# Patient Record
Sex: MALE | Race: WHITE | NOT HISPANIC OR LATINO | Employment: FULL TIME | URBAN - METROPOLITAN AREA
[De-identification: names, ages, dates, MRNs, and addresses within clinical notes are randomized per-mention and may not be internally consistent; named-entity substitution may affect disease eponyms.]

---

## 2023-05-23 DIAGNOSIS — Z00.6 EXAMINATION FOR NORMAL COMPARISON OR CONTROL IN CLINICAL RESEARCH: Primary | ICD-10-CM

## 2023-05-30 ENCOUNTER — PATIENT OUTREACH (OUTPATIENT)
Dept: ONCOLOGY | Facility: CLINIC | Age: 53
End: 2023-05-30

## 2023-05-30 ENCOUNTER — OFFICE VISIT (OUTPATIENT)
Dept: PULMONOLOGY | Facility: CLINIC | Age: 53
End: 2023-05-30

## 2023-05-30 VITALS
BODY MASS INDEX: 35.89 KG/M2 | SYSTOLIC BLOOD PRESSURE: 116 MMHG | OXYGEN SATURATION: 97 % | HEIGHT: 72 IN | TEMPERATURE: 98.4 F | WEIGHT: 265 LBS | DIASTOLIC BLOOD PRESSURE: 80 MMHG | HEART RATE: 117 BPM

## 2023-05-30 DIAGNOSIS — R91.1 PULMONARY NODULE: Primary | ICD-10-CM

## 2023-05-30 DIAGNOSIS — R91.1 LUNG NODULE: Primary | ICD-10-CM

## 2023-05-30 DIAGNOSIS — I26.99 OTHER ACUTE PULMONARY EMBOLISM, UNSPECIFIED WHETHER ACUTE COR PULMONALE PRESENT: ICD-10-CM

## 2023-05-30 DIAGNOSIS — I26.99 PULMONARY INFARCT: ICD-10-CM

## 2023-05-30 DIAGNOSIS — R07.89 NON-CARDIAC CHEST PAIN: ICD-10-CM

## 2023-05-30 RX ORDER — PREDNISONE 10 MG/1
TABLET ORAL
Qty: 31 TABLET | Refills: 0 | Status: SHIPPED | OUTPATIENT
Start: 2023-05-30

## 2023-05-30 RX ORDER — LEVOFLOXACIN 750 MG/1
1 TABLET ORAL DAILY
COMMUNITY
Start: 2023-05-15 | End: 2023-05-30

## 2023-05-30 RX ORDER — LISINOPRIL 5 MG/1
1 TABLET ORAL DAILY
COMMUNITY
Start: 2023-02-15

## 2023-05-30 NOTE — PROGRESS NOTES
New Patient Pulmonary Office Visit      Patient Name: Adriano Galindo    Referring Physician: King Lyons PA    Chief Complaint:    Chief Complaint   Patient presents with   • Lung Nodule     F/u        History of Present Illness: Adriano Galindo is a 52 y.o. male who is here today to establish care with Pulmonary.  Patient is a past medical history significant for pulmonary embolism, hypertension and hyperlipidemia.  Who had had a CT scan of the chest back in April 2023 where there was a fairly large burden of pulmonary embolism in the right main pulmonary artery extending out through the right upper and lower lobes.  At that point in time there was a new nodular density subpleural in the right middle lobe and right lower lobe crossing the fissure.  Patient continued to have ongoing chest pain therefore had a repeat CT scan in May 2023 showing that the majority of the pulmonary embolism had resolved, but the nodular 19 mm density that was subpleural was still present.  He states that his chest pain has gradually gotten better.  He denies any nausea, vomiting, fever, or chills.  Prior to all the symptoms beginning back in October he was having pain in his hips and found that he could not walk around as much and also getting very short of breath.  He did a full set of PFTs through his primary care provider in addition to that had a large work-up performed including autoimmune diseases but states everything was negative.  It is that since he was found to have a pulmonary embolism he continues to have symptoms.    Review of Systems:   Review of Systems   Constitutional: Positive for fatigue. Negative for activity change, appetite change, chills and diaphoresis.   HENT: Negative for congestion, postnasal drip, sinus pressure and voice change.    Eyes: Negative for blurred vision.   Respiratory: Positive for shortness of breath. Negative for cough and wheezing.    Cardiovascular: Negative for chest pain.  "  Gastrointestinal: Negative for abdominal pain.   Musculoskeletal: Positive for arthralgias. Negative for myalgias.   Skin: Negative for color change and dry skin.   Allergic/Immunologic: Negative for environmental allergies.   Neurological: Negative for weakness and confusion.   Hematological: Negative for adenopathy.   Psychiatric/Behavioral: Negative for sleep disturbance and depressed mood.       Past Medical History: History reviewed. No pertinent past medical history.    Past Surgical History: History reviewed. No pertinent surgical history.    Family History:   Family History   Problem Relation Age of Onset   • Pulmonary fibrosis Father        Social History:   Social History     Socioeconomic History   • Marital status:    Tobacco Use   • Smoking status: Former     Types: Cigarettes     Quit date: 2023     Years since quittin.1   Substance and Sexual Activity   • Drug use: Not Currently   • Sexual activity: Defer       Medications:     Current Outpatient Medications:   •  lisinopril (PRINIVIL,ZESTRIL) 5 MG tablet, Take 1 tablet by mouth Daily., Disp: , Rfl:   •  apixaban (ELIQUIS) 5 MG tablet tablet, Take 1 tablet by mouth 2 (Two) Times a Day., Disp: 60 tablet, Rfl: 6  •  predniSONE (DELTASONE) 10 MG tablet, Take 4 tabs daily x 4 days, then take 3 daily x 3 days, then take 2 daily for 2 days, then take 1 daily x 2 days, Disp: 31 tablet, Rfl: 0    Allergies:   No Known Allergies    Physical Exam:  Vital Signs:   Vitals:    23 1057   BP: 116/80   BP Location: Left arm   Patient Position: Sitting   Cuff Size: Adult   Pulse: 117   Temp: 98.4 °F (36.9 °C)   TempSrc: Infrared   SpO2: 97%  Comment: resting room air   Weight: 120 kg (265 lb)   Height: 182.9 cm (72\")       Physical Exam  Vitals and nursing note reviewed.   Constitutional:       General: He is not in acute distress.     Appearance: He is well-developed and normal weight. He is not ill-appearing or toxic-appearing. "   Cardiovascular:      Rate and Rhythm: Normal rate and regular rhythm.      Pulses: Normal pulses.      Heart sounds: Normal heart sounds. No murmur heard.    No gallop.   Pulmonary:      Effort: Pulmonary effort is normal.      Breath sounds: Normal breath sounds. No wheezing, rhonchi or rales.   Musculoskeletal:      Right lower leg: No edema.      Left lower leg: No edema.   Neurological:      Mental Status: He is alert.         Immunization History   Administered Date(s) Administered   • COVID-19 (PFIZER) Purple Cap Monovalent 04/14/2021, 05/04/2021   • FluLaval/Fluzone >6mos 11/30/2018   • Hepatitis A 11/30/2018       Results Review:   -I personally viewed the patient's CT scan of the chest from May 2023 which continue to show an 18 mm subpleural nodule in the right upper and lower lobes that is unchanged to slightly smaller from April 2023 in addition to some subsegmental clots throughout the right lower lobe.  -I personally viewed the patient's CT scan of the chest from April 2023, which showed a 18 mm subpleural nodule between the right upper and lower lobes with a large amount of clot burden in the main pulmonary artery  - I personally reviewed the pts imaging from CT scan of the chest from January 2023 showed no nodules, masses, or infiltrates.  - I personally reviewed the pts chart with regards to evaluation by the patient's PCP.    Assessment / Plan:   Diagnoses and all orders for this visit:    1. Pulmonary nodule (Primary)  2. Pulmonary infarct  3. Other acute pulmonary embolism, unspecified whether acute cor pulmonale present  4. Non-cardiac chest pain  -Patient appears to have a pulmonary infarct in the setting of his previously seen pulmonary nodule.  The nodule is roughly stable in size to a little bit smaller over just the course of 1 month and even back to January there was really no large nodular density in that area.  Think cancer is very low likelihood.  Most likely this is an infarct with an  area of atelectasis.  There could be some post infection, but he has completed a couple rounds of antibiotics at this point states symptoms have improved especially when is on antibiotics.  He has not received any steroids.  There is also the possibility of ongoing inflammation in the area.  His most recent CT scan showed that the clot had resolved drastically at this point.  For now want him to go ahead and start a prednisone taper for the possibility of organizing pneumonia in the area.  He also should continue on his Eliquis for at least 6 months.  I went ahead and refilled his Eliquis on today's visit.  And then I will also plan for repeat CT scan in 3 months.  -I am going to get the records from his PCP regarding his autoimmune labs and his PFTs.  He informed me that all this was normal.  If that is not true or testing is an adequate.  We will look to repeat testing at the next visit.  -I will plan to do a coagulopathy work-up if not been done already after he has completed his course of anticoagulation.      Follow Up:   Return in about 3 months (around 8/30/2023) for CT Chest with next visit.     KRZYSZTOF Gordon, DO  Pulmonary and Critical Care Medicine  Note Electronically Signed    Part of this note may be an electronic transcription/translation of spoken language to printed text using the Dragon Dictation System.

## 2023-08-11 ENCOUNTER — PATIENT OUTREACH (OUTPATIENT)
Dept: ONCOLOGY | Facility: CLINIC | Age: 53
End: 2023-08-11
Payer: COMMERCIAL